# Patient Record
Sex: MALE | Race: BLACK OR AFRICAN AMERICAN
[De-identification: names, ages, dates, MRNs, and addresses within clinical notes are randomized per-mention and may not be internally consistent; named-entity substitution may affect disease eponyms.]

---

## 2019-06-03 ENCOUNTER — HOSPITAL ENCOUNTER (EMERGENCY)
Dept: HOSPITAL 92 - ERS | Age: 39
Discharge: HOME | End: 2019-06-03
Payer: SELF-PAY

## 2019-06-03 DIAGNOSIS — F17.210: ICD-10-CM

## 2019-06-03 DIAGNOSIS — S62.664A: Primary | ICD-10-CM
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
## 2019-06-03 PROCEDURE — 10060 I&D ABSCESS SIMPLE/SINGLE: CPT

## 2019-06-03 NOTE — RAD
EXAM: 3 views of the ring finger



HISTORY: Finger pain



COMPARISON: None



FINDINGS: There is a fracture of the distal phalanx of the ring finger through the midportion. Mild s
oft tissue swelling is seen. No degenerative changes are present. No radiopaque foreign body is

seen.



IMPRESSION: Distal phalanx fracture of the ring finger



Reported By: Dale Webster 

Electronically Signed:  6/3/2019 10:39 AM

## 2019-06-18 ENCOUNTER — HOSPITAL ENCOUNTER (EMERGENCY)
Dept: HOSPITAL 92 - ERS | Age: 39
Discharge: HOME | End: 2019-06-18
Payer: SELF-PAY

## 2019-06-18 DIAGNOSIS — F17.210: ICD-10-CM

## 2019-06-18 DIAGNOSIS — K21.0: Primary | ICD-10-CM

## 2019-06-18 LAB
ALBUMIN SERPL BCG-MCNC: 4.3 G/DL (ref 3.5–5)
ALP SERPL-CCNC: 51 U/L (ref 40–150)
ALT SERPL W P-5'-P-CCNC: 31 U/L (ref 8–55)
ANION GAP SERPL CALC-SCNC: 9 MMOL/L (ref 10–20)
AST SERPL-CCNC: 21 U/L (ref 5–34)
BASOPHILS # BLD AUTO: 0 THOU/UL (ref 0–0.2)
BASOPHILS NFR BLD AUTO: 0.7 % (ref 0–1)
BILIRUB SERPL-MCNC: 1 MG/DL (ref 0.2–1.2)
BUN SERPL-MCNC: 10 MG/DL (ref 8.9–20.6)
CALCIUM SERPL-MCNC: 9.3 MG/DL (ref 7.8–10.44)
CHLORIDE SERPL-SCNC: 100 MMOL/L (ref 98–107)
CO2 SERPL-SCNC: 32 MMOL/L (ref 22–29)
CREAT CL PREDICTED SERPL C-G-VRATE: 0 ML/MIN (ref 70–130)
EOSINOPHIL # BLD AUTO: 0.1 THOU/UL (ref 0–0.7)
EOSINOPHIL NFR BLD AUTO: 2.1 % (ref 0–10)
GLOBULIN SER CALC-MCNC: 3.2 G/DL (ref 2.4–3.5)
GLUCOSE SERPL-MCNC: 77 MG/DL (ref 70–105)
HGB BLD-MCNC: 14.8 G/DL (ref 14–18)
LIPASE SERPL-CCNC: 12 U/L (ref 8–78)
LYMPHOCYTES # BLD: 1.8 THOU/UL (ref 1.2–3.4)
LYMPHOCYTES NFR BLD AUTO: 34 % (ref 21–51)
MCH RBC QN AUTO: 28.4 PG (ref 27–31)
MCV RBC AUTO: 87.7 FL (ref 78–98)
MONOCYTES # BLD AUTO: 0.5 THOU/UL (ref 0.11–0.59)
MONOCYTES NFR BLD AUTO: 8.7 % (ref 0–10)
NEUTROPHILS # BLD AUTO: 2.8 THOU/UL (ref 1.4–6.5)
NEUTROPHILS NFR BLD AUTO: 54.5 % (ref 42–75)
PLATELET # BLD AUTO: 242 THOU/UL (ref 130–400)
POTASSIUM SERPL-SCNC: 3.4 MMOL/L (ref 3.5–5.1)
RBC # BLD AUTO: 5.21 MILL/UL (ref 4.7–6.1)
SODIUM SERPL-SCNC: 138 MMOL/L (ref 136–145)
WBC # BLD AUTO: 5.2 THOU/UL (ref 4.8–10.8)

## 2019-06-18 PROCEDURE — 71045 X-RAY EXAM CHEST 1 VIEW: CPT

## 2019-06-18 PROCEDURE — 85025 COMPLETE CBC W/AUTO DIFF WBC: CPT

## 2019-06-18 PROCEDURE — 36415 COLL VENOUS BLD VENIPUNCTURE: CPT

## 2019-06-18 PROCEDURE — 80053 COMPREHEN METABOLIC PANEL: CPT

## 2019-06-18 PROCEDURE — 83690 ASSAY OF LIPASE: CPT

## 2019-06-18 PROCEDURE — 83880 ASSAY OF NATRIURETIC PEPTIDE: CPT

## 2019-06-18 PROCEDURE — 93005 ELECTROCARDIOGRAM TRACING: CPT

## 2019-06-18 PROCEDURE — 84484 ASSAY OF TROPONIN QUANT: CPT

## 2019-06-18 NOTE — RAD
SINGLE VIEW OF THE CHEST:

6/18/19

 

COMPARISON: 

8/4/09

 

HISTORY: 

Epigastric abdominal pain. 

 

FINDINGS:

Single view of the chest shows a normal sized cardiomediastinal silhouette. There is no evidence of c
onsolidation, mass, or pleural effusion. The bones are unremarkable.

 

IMPRESSION:

No evidence of acute cardiopulmonary disease.

 

POS: [facility]

## 2019-06-22 NOTE — EKG
Test Reason : 

Blood Pressure : ***/*** mmHG

Vent. Rate : 055 BPM     Atrial Rate : 055 BPM

   P-R Int : 138 ms          QRS Dur : 098 ms

    QT Int : 454 ms       P-R-T Axes : 019 004 -19 degrees

   QTc Int : 434 ms

 

Sinus bradycardia

Minimal voltage criteria for LVH, may be normal variant

Borderline ECG

 

Confirmed by SASHA GREEN, SISI ([id number]),  ALLY URENA ([id number]) on 6/22/2019 4:42:09 PM

 

Referred By:             Confirmed By:SISI BAEZ MD